# Patient Record
Sex: MALE
[De-identification: names, ages, dates, MRNs, and addresses within clinical notes are randomized per-mention and may not be internally consistent; named-entity substitution may affect disease eponyms.]

---

## 2018-04-18 ENCOUNTER — HOSPITAL ENCOUNTER (EMERGENCY)
Dept: HOSPITAL 31 - C.ER | Age: 23
Discharge: HOME | End: 2018-04-18
Payer: COMMERCIAL

## 2018-04-18 VITALS
TEMPERATURE: 99 F | SYSTOLIC BLOOD PRESSURE: 118 MMHG | DIASTOLIC BLOOD PRESSURE: 76 MMHG | OXYGEN SATURATION: 100 % | HEART RATE: 93 BPM

## 2018-04-18 VITALS — RESPIRATION RATE: 18 BRPM

## 2018-04-18 DIAGNOSIS — J11.1: Primary | ICD-10-CM

## 2018-04-18 NOTE — C.PDOC
History Of Present Illness


21 y/o M c no PMHx p/w low grade fever, myalgias, cough that began suddenly 

last night. Denies stiff neck, recent travel, vomiting, dyspnea, sore throat, 

ear pain. Patient requesting fingerstick due to family history of DM.


Time Seen by Provider: 04/18/18 11:27


Chief Complaint (Nursing): Flu-like Symptoms





Past Medical History


Vital Signs: 


 Last Vital Signs











Temp  99 F   04/18/18 11:23


 


Pulse  93 H  04/18/18 11:23


 


Resp  16   04/18/18 11:23


 


BP  118/76   04/18/18 11:23


 


Pulse Ox  100   04/18/18 11:45











Family History: States: Diabetes





- Social History


Hx Alcohol Use: Yes


Hx Substance Use: No





- Immunization History


Hx Influenza Vaccination: No





Review Of Systems


Except As Marked, All Systems Reviewed And Found Negative.


Respiratory: Negative for: Shortness of Breath


Gastrointestinal: Negative for: Vomiting





Physical Exam





- Physical Exam


Additional Physical Exam Comments: 


Gen: NAD


Head: NC/AT


Eyes: No scleral icterus


ENT: No pharyngeal erythema or exudates


Neck: Supple, no rigidity


Chest: No tenderness


CV: Reg rate


Lungs: CTA b/l


Abd: Soft, NT


Back: No CVA tenderness


Skin: No rash


Extremities: No edema


Neuro: Alert





ED Course And Treatment


O2 Sat by Pulse Oximetry: 100





Medical Decision Making


Medical Decision Making: 


Start tamiflu empirically. FS 80s. F/u PMD, instructed to return to ED for 

worsening fever, dyspnea, vomiting, pain, lethargy, or any other problem.





Disposition





- Disposition


Referrals: 


[facility] [Outside]


Disposition: HOME/ ROUTINE


Disposition Time: 11:44


Condition: STABLE


Prescriptions: 


Oseltamivir Phosphate [Tamiflu] 1 cap PO BID #9 capsule


Instructions:  Flu, Adult (DC)


Forms:  [facility] (English), Work Excuse





- Clinical Impression


Clinical Impression: 


 Influenza-like illness

## 2018-09-27 ENCOUNTER — HOSPITAL ENCOUNTER (EMERGENCY)
Dept: HOSPITAL 14 - H.ER | Age: 23
Discharge: HOME | End: 2018-09-27
Payer: COMMERCIAL

## 2018-09-27 VITALS
DIASTOLIC BLOOD PRESSURE: 70 MMHG | OXYGEN SATURATION: 98 % | SYSTOLIC BLOOD PRESSURE: 110 MMHG | HEART RATE: 70 BPM | RESPIRATION RATE: 20 BRPM

## 2018-09-27 VITALS — BODY MASS INDEX: 20.3 KG/M2

## 2018-09-27 VITALS — TEMPERATURE: 98 F

## 2018-09-27 DIAGNOSIS — M54.9: Primary | ICD-10-CM

## 2018-09-27 DIAGNOSIS — F12.90: ICD-10-CM

## 2018-09-27 NOTE — ED PDOC
HPI: Back


Chief Complaint (Provider): Low back pina x 1 week, no trauma 


History Per: Patient


History/Exam Limitations: no limitations


Onset/Duration Of Symptoms: Days


Current Symptoms Are (Timing): Still Present


Additional Complaint(s): 





24 yo male with no medical problems presents for evaluation of low back pain for

1 week. Pt states he does a lot of lifting at his job and he was concerned. Pt 

sates he did not take anything for pain and does not want anything. Pt states he

just wants to make sure nothing is wrong. No numbness/tingling. No bladder or 

bowel incontinence. Pt reports smoking marijuana and states it does help with 

the pain. 





<Karine Ramesh - Last Filed: 09/27/18 12:16>





<Adam Saavedra - Last Filed: 09/27/18 18:27>


Time Seen by Provider: 09/27/18 10:56


Chief Complaint (Nursing): Back Pain





Past Medical History


Reviewed: Historical Data, Nursing Documentation, Vital Signs


Vital Signs: 


                                Last Vital Signs











Temp  98 F   09/27/18 11:08


 


Pulse  65   09/27/18 11:08


 


Resp  18   09/27/18 11:08


 


BP  121/79   09/27/18 11:08


 


Pulse Ox  100   09/27/18 11:08














- Medical History


PMH: No Chronic Diseases





- Surgical History


Surgical History: No Surg Hx





- Family History


Family History: States: Diabetes





- Living Arrangements


Living Arrangements: With Family





- Social History


Current smoker - smoking cessation education provided: No


Alcohol: None


Drugs: Cannabis





- Immunization History


Hx Influenza Vaccination: No





<Karine Ramesh - Last Filed: 09/27/18 12:16>


Vital Signs: 


                                Last Vital Signs











Temp  98 F   09/27/18 13:01


 


Pulse  70   09/27/18 13:01


 


Resp  20   09/27/18 13:01


 


BP  110/70   09/27/18 13:01


 


Pulse Ox  98   09/27/18 13:01














<Adam Saavedra - Last Filed: 09/27/18 18:27>





- Home Medications


Home Medications: 


                                Ambulatory Orders











 Medication  Instructions  Recorded


 


Oseltamivir Phosphate [Tamiflu] 1 cap PO BID #9 capsule 04/18/18














- Allergies


Allergies/Adverse Reactions: 


                                    Allergies











Allergy/AdvReac Type Severity Reaction Status Date / Time


 


No Known Allergies Allergy   Verified 04/18/18 11:23














Review of Systems


ROS Statement: Except As Marked, All Systems Reviewed And Found Negative


Constitutional: Negative for: Fever, Chills


Gastrointestinal: Negative for: Nausea, Vomiting, Abdominal Pain


Genitourinary Male: Negative for: Dysuria, Frequency


Musculoskeletal: Positive for: Back Pain





<Karine Ramesh - Last Filed: 09/27/18 12:16>





Physical Exam





- Reviewed


Nursing Documentation Reviewed: Yes


Vital Signs Reviewed: Yes





- Physical Exam


Appears: Positive for: Well, Non-toxic, No Acute Distress


Head Exam: Positive for: ATRAUMATIC, NORMAL INSPECTION, NORMOCEPHALIC


Skin: Positive for: Normal Color, Warm, DRY


Eye Exam: Positive for: Normal appearance


ENT: Positive for: Normal ENT Inspection


Neck: Positive for: Normal, Painless ROM


Cardiovascular/Chest: Negative for: Bradycardia, Tachycardia


Respiratory: Negative for: Accessory Muscle Use, Respiratory Distress


Back: Positive for: Normal Inspection, Vertebral Tenderness.  Negative for: L 

CVA Tenderness, R CVA Tenderness


Extremity: Positive for: Normal ROM.  Negative for: Tenderness


Neurologic/Psych: Positive for: Alert, Oriented





<DexterarleenKarine HAWTHORNE - Last Filed: 09/27/18 12:16>





- ECG


O2 Sat by Pulse Oximetry: 100





<DexterarleenKarine HAWTHORNE - Last Filed: 09/27/18 12:16>





Medical Decision Making


Medical Decision Making: 





Normal LS XR 





<DexterarleenKarine HAWTHORNE - Last Filed: 09/27/18 12:16>





Disposition





- Disposition


Disposition: Routine/Home


Disposition Time: 12:16





<GadielKarine J - Last Filed: 09/27/18 12:16>





<Adam Saavedra - Last Filed: 09/27/18 18:27>





- Clinical Impression


Clinical Impression: 


 Low back pain








- Disposition


Referrals: 


Mira Black MD [Staff Provider] - 


Condition: GOOD


Instructions:  Low Back Pain in Adults


Forms:  [facility] (English), [facility] ED School/Work Excuse





Addendum


Addendum: 





09/27/18 18:27


Reviewed PA chart, and agree.  





<Adam Saavedra - Last Filed: 09/27/18 18:27>

## 2018-09-27 NOTE — RAD
Date of service: 



09/27/2018



PROCEDURE:  Radiographs of the Lumbar Spine.



HISTORY:

low back pain x 1 week







COMPARISON:

No prior.



FINDINGS:



BONES:

Normal alignment. No listhesis. No fracture.



DISC SPACES:

Unremarkable.



OTHER FINDINGS:

None.



IMPRESSION:

Unremarkable radiographs of the lumbar spine.